# Patient Record
Sex: MALE | Race: BLACK OR AFRICAN AMERICAN | Employment: UNEMPLOYED | URBAN - METROPOLITAN AREA
[De-identification: names, ages, dates, MRNs, and addresses within clinical notes are randomized per-mention and may not be internally consistent; named-entity substitution may affect disease eponyms.]

---
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
## 2022-08-23 ENCOUNTER — TRANSCRIBE ORDER (OUTPATIENT)
Dept: SCHEDULING | Age: 22
End: 2022-08-23

## 2022-08-23 DIAGNOSIS — M24.131 DEGENERATIVE TFCC TEAR, RIGHT: Primary | ICD-10-CM

## 2023-01-31 DIAGNOSIS — M24.131 DEGENERATIVE TFCC TEAR, RIGHT: Primary | ICD-10-CM

## 2023-02-03 DIAGNOSIS — M24.131 DEGENERATIVE TFCC TEAR, RIGHT: Primary | ICD-10-CM
